# Patient Record
Sex: FEMALE | Race: WHITE | NOT HISPANIC OR LATINO
[De-identification: names, ages, dates, MRNs, and addresses within clinical notes are randomized per-mention and may not be internally consistent; named-entity substitution may affect disease eponyms.]

---

## 2017-06-01 ENCOUNTER — HOSPITAL ENCOUNTER (OUTPATIENT)
Dept: LAB | Age: 82
Setting detail: SPECIMEN
Discharge: HOME | End: 2017-06-01

## 2018-01-26 ENCOUNTER — RECORDS - HEALTHEAST (OUTPATIENT)
Dept: LAB | Facility: CLINIC | Age: 83
End: 2018-01-26

## 2018-01-26 LAB
ALBUMIN SERPL-MCNC: 3.5 G/DL (ref 3.5–5)
ALP SERPL-CCNC: 80 U/L (ref 45–120)
ALT SERPL W P-5'-P-CCNC: 20 U/L (ref 0–45)
ANION GAP SERPL CALCULATED.3IONS-SCNC: 7 MMOL/L (ref 5–18)
AST SERPL W P-5'-P-CCNC: 22 U/L (ref 0–40)
BILIRUB SERPL-MCNC: 0.5 MG/DL (ref 0–1)
BUN SERPL-MCNC: 19 MG/DL (ref 8–28)
CALCIUM SERPL-MCNC: 9.1 MG/DL (ref 8.5–10.5)
CHLORIDE BLD-SCNC: 104 MMOL/L (ref 98–107)
CHOLEST SERPL-MCNC: 271 MG/DL
CO2 SERPL-SCNC: 24 MMOL/L (ref 22–31)
CREAT SERPL-MCNC: 0.76 MG/DL (ref 0.6–1.1)
FASTING STATUS PATIENT QL REPORTED: ABNORMAL
GFR SERPL CREATININE-BSD FRML MDRD: >60 ML/MIN/1.73M2
GLUCOSE BLD-MCNC: 78 MG/DL (ref 70–125)
HDLC SERPL-MCNC: 63 MG/DL
LDLC SERPL CALC-MCNC: 177 MG/DL
POTASSIUM BLD-SCNC: 4.5 MMOL/L (ref 3.5–5)
PROT SERPL-MCNC: 7.1 G/DL (ref 6–8)
SODIUM SERPL-SCNC: 135 MMOL/L (ref 136–145)
TRIGL SERPL-MCNC: 154 MG/DL
TSH SERPL DL<=0.005 MIU/L-ACNC: 5.84 UIU/ML (ref 0.3–5)

## 2018-03-13 ENCOUNTER — TRANSFERRED RECORDS (OUTPATIENT)
Dept: HEALTH INFORMATION MANAGEMENT | Facility: CLINIC | Age: 83
End: 2018-03-13

## 2018-04-20 ENCOUNTER — RECORDS - HEALTHEAST (OUTPATIENT)
Dept: LAB | Facility: CLINIC | Age: 83
End: 2018-04-20

## 2018-04-20 LAB — TSH SERPL DL<=0.005 MIU/L-ACNC: 3.8 UIU/ML (ref 0.3–5)

## 2019-01-09 ENCOUNTER — RECORDS - HEALTHEAST (OUTPATIENT)
Dept: LAB | Facility: CLINIC | Age: 84
End: 2019-01-09

## 2019-01-09 LAB
ALBUMIN SERPL-MCNC: 3.6 G/DL (ref 3.5–5)
ALP SERPL-CCNC: 84 U/L (ref 45–120)
ALT SERPL W P-5'-P-CCNC: 13 U/L (ref 0–45)
ANION GAP SERPL CALCULATED.3IONS-SCNC: 8 MMOL/L (ref 5–18)
AST SERPL W P-5'-P-CCNC: 21 U/L (ref 0–40)
BASOPHILS # BLD AUTO: 0 THOU/UL (ref 0–0.2)
BASOPHILS NFR BLD AUTO: 0 % (ref 0–2)
BILIRUB SERPL-MCNC: 0.4 MG/DL (ref 0–1)
BUN SERPL-MCNC: 20 MG/DL (ref 8–28)
CALCIUM SERPL-MCNC: 9.2 MG/DL (ref 8.5–10.5)
CHLORIDE BLD-SCNC: 105 MMOL/L (ref 98–107)
CHOLEST SERPL-MCNC: 240 MG/DL
CO2 SERPL-SCNC: 24 MMOL/L (ref 22–31)
CREAT SERPL-MCNC: 0.85 MG/DL (ref 0.6–1.1)
EOSINOPHIL # BLD AUTO: 0.1 THOU/UL (ref 0–0.4)
EOSINOPHIL NFR BLD AUTO: 1 % (ref 0–6)
ERYTHROCYTE [DISTWIDTH] IN BLOOD BY AUTOMATED COUNT: 14.5 % (ref 11–14.5)
FASTING STATUS PATIENT QL REPORTED: ABNORMAL
GFR SERPL CREATININE-BSD FRML MDRD: >60 ML/MIN/1.73M2
GLUCOSE BLD-MCNC: 89 MG/DL (ref 70–125)
HCT VFR BLD AUTO: 34.8 % (ref 35–47)
HDLC SERPL-MCNC: 62 MG/DL
HGB BLD-MCNC: 11 G/DL (ref 12–16)
LDLC SERPL CALC-MCNC: 141 MG/DL
LYMPHOCYTES # BLD AUTO: 2.6 THOU/UL (ref 0.8–4.4)
LYMPHOCYTES NFR BLD AUTO: 46 % (ref 20–40)
MCH RBC QN AUTO: 31.3 PG (ref 27–34)
MCHC RBC AUTO-ENTMCNC: 31.6 G/DL (ref 32–36)
MCV RBC AUTO: 99 FL (ref 80–100)
MONOCYTES # BLD AUTO: 0.4 THOU/UL (ref 0–0.9)
MONOCYTES NFR BLD AUTO: 8 % (ref 2–10)
NEUTROPHILS # BLD AUTO: 2.6 THOU/UL (ref 2–7.7)
NEUTROPHILS NFR BLD AUTO: 46 % (ref 50–70)
PLATELET # BLD AUTO: 208 THOU/UL (ref 140–440)
PMV BLD AUTO: 10.7 FL (ref 8.5–12.5)
POTASSIUM BLD-SCNC: 4.4 MMOL/L (ref 3.5–5)
PROT SERPL-MCNC: 6.8 G/DL (ref 6–8)
RBC # BLD AUTO: 3.52 MILL/UL (ref 3.8–5.4)
SODIUM SERPL-SCNC: 137 MMOL/L (ref 136–145)
TRIGL SERPL-MCNC: 185 MG/DL
TSH SERPL DL<=0.005 MIU/L-ACNC: 3.29 UIU/ML (ref 0.3–5)
WBC: 5.7 THOU/UL (ref 4–11)

## 2019-01-12 LAB — BACTERIA SPEC CULT: ABNORMAL

## 2019-01-18 ENCOUNTER — TELEPHONE (OUTPATIENT)
Dept: OPHTHALMOLOGY | Facility: CLINIC | Age: 84
End: 2019-01-18

## 2019-01-18 ENCOUNTER — TRANSFERRED RECORDS (OUTPATIENT)
Dept: HEALTH INFORMATION MANAGEMENT | Facility: CLINIC | Age: 84
End: 2019-01-18

## 2019-01-18 ENCOUNTER — MEDICAL CORRESPONDENCE (OUTPATIENT)
Dept: HEALTH INFORMATION MANAGEMENT | Facility: CLINIC | Age: 84
End: 2019-01-18

## 2019-01-18 NOTE — TELEPHONE ENCOUNTER
Reviewed with on call provider    Pt scheduled tomorrow AM at [facility] with dr. Black    Son aware of date/time/location  Clive Amador RN 5:37 PM 01/18/19

## 2019-01-18 NOTE — TELEPHONE ENCOUNTER
Called [facility] eye still water at [phone] and spoke to referring physician    Pt been treated by dr. Salcedo     H/o right eye vision 20/500 by history    Left eye as been better seeing eye.    Left eye  In past two weeks since last seen dr. Salcedo-- new central retina vein occlusion, cornea edema, increased intraocular pressure/glaucoma, geographic atrophy  Vision left eye count fingers    IOP today 40 mm Hg    Dr. Bills saw pt today   Pt on timolol, dorzolamide, latanoprost and start alphagan after visit today    Would like f/u tonight/tomorrow for IOP management and referral to glaucoma for likely surgical intervention, will also need to start f/u with retina    Will review with on call eye MD plan of care    Clive Amador RN 4:52 PM 01/18/19

## 2019-01-19 ENCOUNTER — OFFICE VISIT (OUTPATIENT)
Dept: OPHTHALMOLOGY | Facility: CLINIC | Age: 84
End: 2019-01-19
Payer: MEDICARE

## 2019-01-19 DIAGNOSIS — Z96.1 PSEUDOPHAKIA OF BOTH EYES: ICD-10-CM

## 2019-01-19 DIAGNOSIS — H34.8112 CENTRAL RETINAL VEIN OCCLUSION OF RIGHT EYE, UNSPECIFIED COMPLICATION STATUS (H): Primary | ICD-10-CM

## 2019-01-19 DIAGNOSIS — H35.3110 NONEXUDATIVE AGE-RELATED MACULAR DEGENERATION OF RIGHT EYE, UNSPECIFIED STAGE: ICD-10-CM

## 2019-01-19 DIAGNOSIS — H40.052 BORDERLINE GLAUCOMA OF LEFT EYE WITH OCULAR HYPERTENSION: ICD-10-CM

## 2019-01-19 RX ORDER — DORZOLAMIDE HYDROCHLORIDE AND TIMOLOL MALEATE 20; 5 MG/ML; MG/ML
1 SOLUTION/ DROPS OPHTHALMIC 2 TIMES DAILY
COMMUNITY

## 2019-01-19 RX ORDER — LEVOTHYROXINE SODIUM 88 UG/1
88 TABLET ORAL DAILY
COMMUNITY

## 2019-01-19 RX ORDER — METOPROLOL TARTRATE 50 MG
50 TABLET ORAL DAILY
COMMUNITY

## 2019-01-19 RX ORDER — ACETAMINOPHEN 500 MG
500-1000 TABLET ORAL EVERY 6 HOURS PRN
COMMUNITY

## 2019-01-19 RX ORDER — BRIMONIDINE TARTRATE 2 MG/ML
1 SOLUTION/ DROPS OPHTHALMIC 3 TIMES DAILY
Qty: 5 ML | Refills: 0 | Status: SHIPPED | OUTPATIENT
Start: 2019-01-19 | End: 2019-03-28

## 2019-01-19 RX ORDER — LOSARTAN POTASSIUM 50 MG/1
50 TABLET ORAL DAILY
COMMUNITY

## 2019-01-19 RX ORDER — CHOLECALCIFEROL (VITAMIN D3) 50 MCG
1 TABLET ORAL DAILY
COMMUNITY

## 2019-01-19 RX ORDER — BRIMONIDINE TARTRATE 1.5 MG/ML
1 SOLUTION/ DROPS OPHTHALMIC 3 TIMES DAILY
COMMUNITY
End: 2019-03-28 | Stop reason: ALTCHOICE

## 2019-01-19 RX ORDER — LATANOPROST 50 UG/ML
1 SOLUTION/ DROPS OPHTHALMIC DAILY
COMMUNITY

## 2019-01-19 ASSESSMENT — VISUAL ACUITY
METHOD: SNELLEN - LINEAR
OS_CC: CF 2FT
CORRECTION_TYPE: GLASSES
OD_CC: CF 10FT

## 2019-01-19 ASSESSMENT — TONOMETRY
OS_IOP_MMHG: 26
OS_IOP_MMHG: 33
OD_IOP_MMHG: 09
IOP_METHOD: APPLANATION
IOP_METHOD: ICARE

## 2019-01-19 ASSESSMENT — REFRACTION_WEARINGRX
OD_SPHERE: -0.75
SPECS_TYPE: PAL
OD_ADD: +2.50
OD_CYLINDER: +1.25
OS_SPHERE: -2.25
OS_CYLINDER: +2.25
OS_AXIS: 020
OD_AXIS: 010
OS_ADD: +2.50

## 2019-01-19 ASSESSMENT — CONF VISUAL FIELD
OS_SUPERIOR_TEMPORAL_RESTRICTION: 1
OS_INFERIOR_NASAL_RESTRICTION: 1
OS_SUPERIOR_NASAL_RESTRICTION: 1
OD_INFERIOR_TEMPORAL_RESTRICTION: 3
OD_SUPERIOR_TEMPORAL_RESTRICTION: 3
OD_INFERIOR_NASAL_RESTRICTION: 3
OS_INFERIOR_TEMPORAL_RESTRICTION: 1
OD_SUPERIOR_NASAL_RESTRICTION: 3

## 2019-01-19 NOTE — NURSING NOTE
Patient present for consultation for possible cornea edema and vein occlusion LE x's 2-4 wks referred by [facility]. The vision in the LE has severely declined. The eyes feel a little gritty with discharge, no pain or discomfort. Flashes and floaters present. She has low vision with macular degeneration in the RE. Wears corrective lenses. Taking drops for eye pressure. Cherry Almendarez COT 8:36 AM January 19, 2019

## 2019-01-19 NOTE — PROGRESS NOTES
"CC:  CRVO    HPI:  89 y/o F with hx of recent CRVO, treated at [facility] with Dr. Salcedo, sent here for IOP check and glaucoma/retinal referral. Last seen in the office yesterday and found to have IOP 40. Started on topical timolol, dorzolamide, latanoprost, and alphagan.     Here today for IOP check. Given 3 doses of alphagan since starting the drop yesterday.    Patient denies any headache or eye pain. No acute changes since yesterday. No nausea/vomiting. Here with her son.    POH:  -Hx of 20/500 vision right eye - due to macular degeneration - no prior injections/laser    -CRVO left eye for the past 3 weeks (unclear of date of exact diagnosis?)  -s/p CE/IOL each eye in [place] many years ago  -? Hx of glaucoma --had been on timolol each eye for the past 4 years up until recently    Gtts:  Cosopt - 1 drop BID each eye - 7am this morning  Alphagan -1 drop TID left eye (started yesterday) - 6am this morning  Latanoprost - 1 drop at bedtime OU    All:  NKDA    A/P:  1. CRVO, right eye  2. Ocular hypertension, right eye  -IOP today 26 by icare, 33 by applanation (down from 40 yesterday per referral message)  -continue cosopt BID, latanoprost at bedtime, alphagan TID right eye  -recommend lubricating drops frequently for comfort - Refresh Celluvisc  -K surface irregular, improving K edema  -diffuse heme/NV at pupillary margin --> discussed that patient may require anti-VEGF injections +/- laser treatment  -deferred dilation today due to elevated IOP, but can visualize diffuse heme and CWS from CRVO  -discussed optimizing systemic risk factors: of note, pt had recent PCP eval and told BP, blood glucose were \"doing well\"; pt doesn't smoke; recommend tight lipid control  -return precautions reviewed    3. Pseudophakia, each eye  -stable, monitor    4. Macular degeneration, dry,with geograph atrophy, OD  -extensive geographic atrophy seen today right eye -no heme  -no prior injections/lasers per patient hx  -unable to " visualize extent left eye due to heme from CRVO  -recommend retina eval, as above  -AREDs vitamins, smoking cessation, amsler grid    F/u Monday for IOP check, dilation - retina/glaucoma eval    Monique Black MD  PGY-5, Cornea Fellow  Ophthalmology    Complete documentation of historical and exam elements from today's encounter can be found in the full encounter summary report (not reduplicated in this progress note). I personally obtained the chief complaint(s) and history of present illness.  I confirmed and edited as necessary the review of systems, past medical/surgical history, family history, social history, and examination findings as documented by others; and I examined the patient myself. I personally reviewed the relevant tests, images, and reports as documented above. I formulated and edited as necessary the assessment and plan and discussed the findings and management plan with the patient and family. I was present for all procedures performed during this visit.    Monique Black MD  Cornea Fellow

## 2019-01-19 NOTE — PATIENT INSTRUCTIONS
Eye Drop Instructions:    1. Continue dorzolamide/timolol - 1 drop twice daily in both eyes  2. Continue alphagan -  1 drop three times per day, left eye only  3. Continue latanoprost - 1 drop at bedtime, both eyes    4. Start Refresh Celluvisc (gel drop) - apply at least 5 minutes after any prescription drop; can apply up to every 1 hour for dry eye and irritation, as needed.     If you develop any acute severe pain, redness, or loss of vision, please go to the Emergency Department or contact the Eye Clinic immediately at [phone].

## 2019-01-20 ENCOUNTER — TELEPHONE (OUTPATIENT)
Dept: OPHTHALMOLOGY | Facility: CLINIC | Age: 84
End: 2019-01-20

## 2019-01-20 DIAGNOSIS — H34.8122 CENTRAL RETINAL VEIN OCCLUSION OF LEFT EYE, UNSPECIFIED COMPLICATION STATUS (H): Primary | ICD-10-CM

## 2019-01-20 NOTE — TELEPHONE ENCOUNTER
Spoke with patient's son, Basilio.    In short, patient's summary:   89 y/o F with hx of recent CRVO, treated at [facility] with Dr. Salcedo, sent to [facility] over weekend for IOP check and glaucoma/retinal referral. Last seen in the office yesterday and found to have IOP 34, improved on IOP drops. Started on topical timolol, dorzolamide, latanoprost, and alphagan.     Today:     Many concerns. Namely patient is having pain/ache in her LEFT eye. She was seen yesterday [place] [facility] and stated that she was doing better after they left. Per son, she might have missed some of the drops she was supposed to be taking. He was able to administer some this afternoon. In addition, patient having lots of back pain.     Most importantly, son worried because they can't find wheelchair transport to get her here tomorrow. I discussed the importance of this visit, given the urgency of managing patient's IOP and concern for NVI. He said he would try as best he could, and if he couldn't find a ride by tomorrow morning, would call and see if he could get there in the afternoon to be seen.     Son concerned that her pain might become unrelenting and he might have to have an ambulance take her to ER. I suggested if this the case that he goto [place] to expedite ophthalmology involvement.     David Kim MD  Ophthalmology Resident  PGY-2

## 2019-01-21 ENCOUNTER — OFFICE VISIT (OUTPATIENT)
Dept: OPHTHALMOLOGY | Facility: CLINIC | Age: 84
End: 2019-01-21
Attending: OPHTHALMOLOGY
Payer: MEDICARE

## 2019-01-21 DIAGNOSIS — H40.52X1 NEOVASCULAR GLAUCOMA OF LEFT EYE, MILD STAGE: ICD-10-CM

## 2019-01-21 DIAGNOSIS — H34.8121 CENTRAL RETINAL VEIN OCCLUSION WITH NEOVASCULARIZATION OF LEFT EYE (H): Primary | ICD-10-CM

## 2019-01-21 DIAGNOSIS — H34.8111 CENTRAL RETINAL VEIN OCCLUSION WITH NEOVASCULARIZATION OF RIGHT EYE (H): ICD-10-CM

## 2019-01-21 PROCEDURE — 25000128 H RX IP 250 OP 636: Mod: ZF | Performed by: OPHTHALMOLOGY

## 2019-01-21 PROCEDURE — G0463 HOSPITAL OUTPT CLINIC VISIT: HCPCS | Mod: ZF

## 2019-01-21 PROCEDURE — 67028 INJECTION EYE DRUG: CPT | Mod: LT

## 2019-01-21 PROCEDURE — 92134 CPTRZ OPH DX IMG PST SGM RTA: CPT | Mod: ZF | Performed by: OPHTHALMOLOGY

## 2019-01-21 PROCEDURE — C9257 BEVACIZUMAB INJECTION: HCPCS | Mod: ZF | Performed by: OPHTHALMOLOGY

## 2019-01-21 RX ADMIN — Medication 1.25 MG: at 11:43

## 2019-01-21 ASSESSMENT — CONF VISUAL FIELD
OD_SUPERIOR_TEMPORAL_RESTRICTION: 3
OD_INFERIOR_NASAL_RESTRICTION: 3
OS_SUPERIOR_TEMPORAL_RESTRICTION: 1
OS_SUPERIOR_NASAL_RESTRICTION: 1
OS_INFERIOR_NASAL_RESTRICTION: 1
OD_INFERIOR_TEMPORAL_RESTRICTION: 3
OD_SUPERIOR_NASAL_RESTRICTION: 3
OS_INFERIOR_TEMPORAL_RESTRICTION: 1

## 2019-01-21 ASSESSMENT — TONOMETRY
OD_IOP_MMHG: 13
IOP_METHOD: APPLANATION
OS_IOP_MMHG: 26

## 2019-01-21 ASSESSMENT — VISUAL ACUITY
OS_CC: HM
OD_CC: CF 5FT
METHOD: SNELLEN - LINEAR

## 2019-01-21 ASSESSMENT — CUP TO DISC RATIO: OD_RATIO: 0.6

## 2019-01-21 ASSESSMENT — SLIT LAMP EXAM - LIDS
COMMENTS: DERMATOCHALASIS
COMMENTS: DERMATOCHALASIS

## 2019-01-21 NOTE — NURSING NOTE
Patient presents for CRVO LE. Since the last visit the vision is stable. Had one episode of quick sharp stabbing pain in the LE. Taking Alphagan, Latanoprost, & Dorz/Isiah around 6am. Diagnosed with CRVO and possible cornea edema x's 2-4wks. Cherry Almendarez COT 8:36 AM January 21, 2019

## 2019-01-21 NOTE — PROGRESS NOTES
"CC:  CRVO    HPI:  89 y/o F with hx of recent CRVO, treated at [facility] with Dr. Salcedo. Seen 1/18 Friday [facility] for vision loss and \"reddish tinge\" to vision. Symptoms first began a few weeks prior. Initial IOP 40 1/18, seen by Dr. Black 1/19/19 with IOP 33. Continuing topical Cosopt, latanoprost, and alphagan. Having difficulty getting drops in during past day.     H/o baseline poor vision OD 2/2 macular.   Patient denies any headache or eye pain. No acute changes since yesterday. No nausea/vomiting. Here with her son.    POH:  -Hx of 20/500 vision right eye - due to macular degeneration - no prior injections/laser  -CRVO left eye for the past 3 weeks (unclear of date of exact diagnosis? ~1/1/19)  -s/p CE/IOL each eye in [place] many years ago  -? Hx of glaucoma --had been on timolol each eye for the past 4 years up until recently    Gtts:  Cosopt - 1 drop BID each eye - 7am this morning  Alphagan -1 drop TID left eye (started yesterday) - 6am this morning  Latanoprost - 1 drop at bedtime OU    All:  NKDA    A/P:  1. CRVO, left eye with NVI/NVG  2. Ocular hypertension, left eye  -IOP today 26 (from 40 --> 33)  -K surface irregular, improving K edema  -diffuse heme/NV at pupillary margin    - poor dilation, cannot sit at slit lamp; would be difficult laser  - family has no interest in any surgical intervention    - recommend intravitreal Avastin OS today 1/21/19  - continue cosopt BID, latanoprost at bedtime, alphagan TID right eye  - recommend lubricating drops frequently for comfort - Refresh Celluvisc  - discussed optimizing systemic risk factors: of note, pt had recent PCP eval and told BP, blood glucose were \"doing well\"; pt doesn't smoke; recommend tight lipid control  - return precautions reviewed    3. Pseudophakia, each eye  -stable, monitor    4. Macular degeneration, dry,with geographic atrophy, OD  -extensive geographic atrophy seen today right eye -no heme  -no prior injections/lasers per " patient hx  -unable to visualize extent left eye due to heme from CRVO  -recommend retina eval, as above  -AREDs vitamins, smoking cessation, amsler grid      Return 3 weeks retina - dilated, OCT macula, possible injection OS  ideally 1 week for IOP check as well; son will try to find a [place]      Dale Menard M.D.  PGY-3, Ophthalmology    Teaching statement:  Complete documentation of historical and exam elements from today's encounter can be found in the full encounter summary report (not reduplicated in this progress note). I personally obtained the chief complaint(s) and history of present illness.  I confirmed and edited as necessary the review of systems, past medical/surgical history, family history, social history, and examination findings as documented by others; and I examined the patient myself. I personally reviewed the relevant tests, images, and reports as documented above.     I formulated and edited as necessary the assessment and plan and discussed the findings and management plan with the patient and family.    Freida Cano MD  Comprehensive Ophthalmology & Ocular Pathology  Department of Ophthalmology and Visual Neurosciences  [email or web address]  Pager [phone]

## 2019-02-11 DIAGNOSIS — H34.8120 CENTRAL RETINAL VEIN OCCLUSION WITH MACULAR EDEMA OF LEFT EYE (H): Primary | ICD-10-CM

## 2019-02-12 ENCOUNTER — TELEPHONE (OUTPATIENT)
Dept: OPHTHALMOLOGY | Facility: CLINIC | Age: 84
End: 2019-02-12

## 2019-02-12 NOTE — TELEPHONE ENCOUNTER
Rescheduled to this Friday at 0800  Clive Amador RN 8:06 AM 02/12/19           Health Call Center    Phone Message    May a detailed message be left on voicemail: yes    Reason for Call: Other: Pt needs to reschedule. Next available with Milli is 02/26. Pt's son is unsure if Milli wants pt to be seen sooner than that or if that would be an appropriate f/u time frame. Son says that Pt gets a shot for a blockage in her Retina. Please call son Basilio back.     Action Taken: Message routed to:  [facility] ([facility]): [facility] oph adult csc

## 2019-02-15 ENCOUNTER — OFFICE VISIT (OUTPATIENT)
Dept: OPHTHALMOLOGY | Facility: CLINIC | Age: 84
End: 2019-02-15
Attending: OPHTHALMOLOGY
Payer: MEDICARE

## 2019-02-15 DIAGNOSIS — H34.8120 CENTRAL RETINAL VEIN OCCLUSION WITH MACULAR EDEMA OF LEFT EYE (H): ICD-10-CM

## 2019-02-15 DIAGNOSIS — H34.8121 CENTRAL RETINAL VEIN OCCLUSION WITH NEOVASCULARIZATION OF LEFT EYE (H): Primary | ICD-10-CM

## 2019-02-15 PROCEDURE — G0463 HOSPITAL OUTPT CLINIC VISIT: HCPCS | Mod: 25

## 2019-02-15 PROCEDURE — 67028 INJECTION EYE DRUG: CPT | Mod: LT,ZF | Performed by: OPHTHALMOLOGY

## 2019-02-15 PROCEDURE — 92235 FLUORESCEIN ANGRPH MLTIFRAME: CPT | Mod: ZF | Performed by: OPHTHALMOLOGY

## 2019-02-15 PROCEDURE — 92134 CPTRZ OPH DX IMG PST SGM RTA: CPT | Mod: ZF | Performed by: OPHTHALMOLOGY

## 2019-02-15 PROCEDURE — 25000128 H RX IP 250 OP 636: Mod: ZF | Performed by: OPHTHALMOLOGY

## 2019-02-15 PROCEDURE — C9257 BEVACIZUMAB INJECTION: HCPCS | Mod: ZF | Performed by: OPHTHALMOLOGY

## 2019-02-15 PROCEDURE — 67228 TREATMENT X10SV RETINOPATHY: CPT | Mod: LT,ZF | Performed by: OPHTHALMOLOGY

## 2019-02-15 RX ADMIN — Medication 1.25 MG: at 11:22

## 2019-02-15 SDOH — HEALTH STABILITY: MENTAL HEALTH: HOW OFTEN DO YOU HAVE A DRINK CONTAINING ALCOHOL?: NEVER

## 2019-02-15 ASSESSMENT — REFRACTION_WEARINGRX
OD_CYLINDER: +1.25
OD_AXIS: 010
OS_ADD: +2.50
SPECS_TYPE: PAL
OD_ADD: +2.50
OS_AXIS: 020
OD_SPHERE: -0.75
OS_SPHERE: -2.25
OS_CYLINDER: +2.25

## 2019-02-15 ASSESSMENT — CONF VISUAL FIELD
OS_INFERIOR_NASAL_RESTRICTION: 1
METHOD: COUNTING FINGERS

## 2019-02-15 ASSESSMENT — SLIT LAMP EXAM - LIDS
COMMENTS: DERMATOCHALASIS
COMMENTS: DERMATOCHALASIS

## 2019-02-15 ASSESSMENT — VISUAL ACUITY
CORRECTION_TYPE: GLASSES
OS_CC: HM
OD_CC: CF @ 3'
METHOD: SNELLEN - LINEAR

## 2019-02-15 ASSESSMENT — CUP TO DISC RATIO
OS_RATIO: SMALL
OD_RATIO: 0.6

## 2019-02-15 ASSESSMENT — TONOMETRY
OS_IOP_MMHG: 18
IOP_METHOD: APPLANATION
OD_IOP_MMHG: 16

## 2019-02-15 NOTE — PROGRESS NOTES
CC -   CRVO- new referral from [facility] for CRVO     INTERVAL HISTORY - Initial visit with me. No clear changes in vision per patient & family  No HA/temporal tender/jaw claudication/weight loss/fever.  Good TA pulses BL, no tenderness on palpation    HPI -   Kala Peña is a 89 year old year-old monocular (right eye baseline 20/500 due to AMD, no prior injections/ laser) patient with history of OAG, AMD, recent CRVO LEFT eye.  Seen 1/18/19 [facility] by Dr Salcedo for vision loss & reddish tinge OS that had begun few weeks prior with progression (~1/1/19 onset).  Initial IOP OS 40 on 1/18/19, seen by Dr. Black was 1/19/19, started on gtts.  Given Avastin #1 1/21/19  Had prior eye exam 11/30/19 for baseline glaucoma was on xalatan and Cosopt baseline, added alphagan with latest IOP rise after CRVO  H/o valvular insufficiency, no h/o CVA/TIA, had cardiac U/S per son ~2017    GTTS  Cosopt - 1 drop BID each eye   Latanoprost - 1 drop at bedtime OU  Alphagan -1 drop TID left eye         PAST OCULAR SURGERY  CE/IOL OU ~ 1999      RETINAL IMAGING:  OCT 2/15/19  OD - Geographic atrophy, moderate drusen, no IRF/SRF, PVD  OS - Moderate drusen, retinal thickening, trace IRF, tr ERM, PVD    FA 2-15-19  OD -   OS -       ASSESSMENT & PLAN    1.  Combined CRVO/CRAO OS with NVG   - ?onset ~ 12/2018   - had routine eye exam 11/30/18 not present    - strongly suspect CRAO based on exam and imaging   - ischemic with NVG   - minimal CME not likely visually significant   - s/p Avastin 1-21-19 NVI regressed (25 days)     - plan PRP today with Avastin today (difficult for patient to return frequently)   - RTC 6 weeks possible fill-in PRP (1000 spots today)    2.  CRAO OS   - strongly suspect CRAO based on exam and imaging   - most likely d/t CRVO   - no GCA symptoms, clinical suspicion low   - had cardiac U/S 2 years ago (~2017) per son   - could consider carotid U/S   - d/w patient & son declines imaging or blood tests   -  wishes minimal interventions or visits      3. NVG OS   - see #1 for injections/laser    - IOP OK today   - continue gtts   - sees Dr. Salcedo locally    4. Dry AMD OD with GA   - advanced, vision loss   - refer to low vision      5. PVD OU      RTC 6 weeks OCT OU    Carolin Hurtado MD  PGY-3 Ophthalmology    ATTESTATION     Attending Physician Attestation:      Complete documentation of historical and exam elements from today's encounter can be found in the full encounter summary report (not reduplicated in this progress note).  I personally obtained the chief complaint(s) and history of present illness.  I confirmed and edited as necessary the review of systems, past medical/surgical history, family history, social history, and examination findings as documented by others; and I examined the patient myself.  I personally reviewed the relevant tests, images, and reports as documented above.  I formulated and edited as necessary the assessment and plan and discussed the findings and management plan with the patient and family, I was present for the entire procedure performed by the resident/fellow. and I was present for critical portions of the procedure carried out by the resident/fellow and immediately available for the entire procedure    Tere Morley MD, PhD  , Vitreoretinal Surgery  Department of Ophthalmology  [facility]

## 2019-02-15 NOTE — NURSING NOTE
Chief Complaints and History of Present Illnesses   Patient presents with     Follow Up      Chief Complaint(s) and History of Present Illness(es)     Follow Up     Laterality: left eye    Treatments tried: eye drops              Comments     3 week follow up of CRVO left eye with dilated exam.    Vision is the same in each eye since last exam 3 weeks ago.  No changes noticed per patient.  History of :  CRVO left eye   Eye meds: cosopt BID each eye, latanoprost at bedtime each eye, alphagan TID right eye  ESTIVEN Salcedo 2/15/2019 8:26 AM

## 2019-03-28 ENCOUNTER — TELEPHONE (OUTPATIENT)
Dept: OPHTHALMOLOGY | Facility: CLINIC | Age: 84
End: 2019-03-28

## 2019-03-28 DIAGNOSIS — H34.8112 CENTRAL RETINAL VEIN OCCLUSION OF RIGHT EYE, UNSPECIFIED COMPLICATION STATUS (H): ICD-10-CM

## 2019-03-28 RX ORDER — BRIMONIDINE TARTRATE 2 MG/ML
1 SOLUTION/ DROPS OPHTHALMIC 3 TIMES DAILY
Qty: 5 ML | Refills: 1 | Status: SHIPPED | OUTPATIENT
Start: 2019-03-28 | End: 2019-05-21

## 2019-03-28 NOTE — TELEPHONE ENCOUNTER
Pt to continue brimonidine 3/day left eye per last note--Rx sent  Clive Amador RN 4:20 PM 03/28/19        [facility] Call Center    Phone Message    May a detailed message be left on voicemail: yes    Reason for Call: Medication Question or concern regarding medication   Prescription Clarification  Name of Medication: brimonidine (ALPHAGAN) 0.2 % ophthalmic solution  Prescribing Provider: JORGE   Pharmacy: [facility] [address] [place] [address] [address] AT [facility] [address]   What on the order needs clarification? Pt/Son is wondering if this Rx is meant to be temporary or if pt needs to continue taking it. Pt will be out by the end of the week and if it's not temporary, please refill to above pharmacy.     Action Taken: Message routed to:  [facility] ([facility]): EYE

## 2019-04-02 ENCOUNTER — OFFICE VISIT (OUTPATIENT)
Dept: OPHTHALMOLOGY | Facility: CLINIC | Age: 84
End: 2019-04-02
Attending: OPHTHALMOLOGY
Payer: MEDICARE

## 2019-04-02 DIAGNOSIS — H34.8120 CENTRAL RETINAL VEIN OCCLUSION WITH MACULAR EDEMA OF LEFT EYE (H): ICD-10-CM

## 2019-04-02 DIAGNOSIS — H34.8121 CENTRAL RETINAL VEIN OCCLUSION WITH NEOVASCULARIZATION OF LEFT EYE (H): ICD-10-CM

## 2019-04-02 PROCEDURE — 92134 CPTRZ OPH DX IMG PST SGM RTA: CPT | Mod: ZF | Performed by: OPHTHALMOLOGY

## 2019-04-02 PROCEDURE — G0463 HOSPITAL OUTPT CLINIC VISIT: HCPCS | Mod: ZF

## 2019-04-02 PROCEDURE — 92250 FUNDUS PHOTOGRAPHY W/I&R: CPT | Mod: XS | Performed by: OPHTHALMOLOGY

## 2019-04-02 RX ORDER — HYDROCODONE BITARTRATE AND ACETAMINOPHEN 5; 325 MG/1; MG/1
TABLET ORAL
Refills: 0 | COMMUNITY
Start: 2019-03-25

## 2019-04-02 ASSESSMENT — VISUAL ACUITY
CORRECTION_TYPE: GLASSES
METHOD: SNELLEN - LINEAR
OD_CC: CF @ 3 FT
OS_CC: CF @ 2FT

## 2019-04-02 ASSESSMENT — REFRACTION_WEARINGRX
OD_CYLINDER: +1.25
OS_SPHERE: -2.25
SPECS_TYPE: PAL
OS_CYLINDER: +2.25
OD_SPHERE: -0.75
OD_ADD: +2.50
OS_ADD: +2.50
OD_AXIS: 010
OS_AXIS: 020

## 2019-04-02 ASSESSMENT — CONF VISUAL FIELD
OS_INFERIOR_NASAL_RESTRICTION: 3
OD_SUPERIOR_NASAL_RESTRICTION: 3
OS_SUPERIOR_TEMPORAL_RESTRICTION: 3
OD_INFERIOR_TEMPORAL_RESTRICTION: 3
OS_INFERIOR_TEMPORAL_RESTRICTION: 3
OD_SUPERIOR_TEMPORAL_RESTRICTION: 3
OD_INFERIOR_NASAL_RESTRICTION: 3
OS_SUPERIOR_NASAL_RESTRICTION: 3

## 2019-04-02 ASSESSMENT — SLIT LAMP EXAM - LIDS
COMMENTS: DERMATOCHALASIS
COMMENTS: DERMATOCHALASIS

## 2019-04-02 ASSESSMENT — TONOMETRY
OD_IOP_MMHG: 14
IOP_METHOD: APPLANATION
IOP_METHOD: ICARE
IOP_METHOD: TONOPEN
OD_IOP_MMHG: 15
OS_IOP_MMHG: 45
OS_IOP_MMHG: 34
OS_IOP_MMHG: 35

## 2019-04-02 ASSESSMENT — CUP TO DISC RATIO
OS_RATIO: SMALL
OD_RATIO: 0.6

## 2019-04-02 NOTE — LETTER
4/2/2019       RE: Kala Peña  [address]  [place] [address]     Dear Colleague,    Thank you for referring your patient, Kala Peña, to the EYE CLINIC at [facility]. Please see a copy of my visit note below.    CC -   CRVO follow up    INTERVAL HISTORY - Patient denies changes since last visit. Last seen for glaucoma January at [facility].  No pain    HPI - Kala Peña is a 89 year old year-old monocular (right eye baseline 20/500 due to AMD, no prior injections/ laser) patient with history of OAG, AMD, recent CRVO LEFT eye.  Seen 1/18/19 [facility] by Dr Salcedo for vision loss & reddish tinge OS that had begun few weeks prior with progression (~1/1/19 onset).  Initial IOP OS 40 on 1/18/19, seen by Dr. Black was 1/19/19, started on gtts.  Given Avastin #1 1/21/19  Had prior eye exam 11/30/19 for baseline glaucoma was on xalatan and Cosopt baseline, added alphagan with latest IOP rise after CRVO  H/o valvular insufficiency, no h/o CVA/TIA, had cardiac U/S per son ~2017    GTTS  Cosopt - 1 drop BID each eye   Latanoprost - 1 drop at bedtime OU  Alphagan -1 drop TID left eye (x 2 months)      PAST OCULAR SURGERY  CE/IOL OU ~ 1999    RETINAL IMAGING:  OCT 4-2-19  OD - Geographic atrophy, moderate drusen, no IRF/SRF, PVD, appears stable  OS - Moderate drusen, retinal thickening, trace IRF, tr ERM, PVD, appear stable    FA 2-15-19  OD - staining in area of macular atrophy  OS - (transit) Delayed arterial filling and delayed AV transit, blockage in area of heme  ASSESSMENT & PLAN    1.  Combined CRVO/CRAO OS with NVG   - ?onset ~ 12/2018   - had routine eye exam 11/30/18 not present    - strongly suspect CRAO based on exam and imaging   - ischemic with NVG   - minimal CME not likely visually significant   - s/p Avastin 1-21-19 NVI regressed (2.5 months)     - s/p PRP with Avastin  2/15/19 (1.5 months)   - f/u 1 month    2.  CRAO OS   - strongly suspect CRAO based  on exam and imaging   - most likely d/t CRVO   - no GCA symptoms, clinical suspicion low   - had cardiac U/S 2 years ago (~2017) per son   - could consider carotid U/S   - d/w patient & son declines imaging or blood tests   - wishes minimal interventions or visits    3. NVG OS   - see #1 for injections/laser    - IOP elevated today   - continue gtts   - sees Dr. Salcedo locally   - hesitant to start Diamox due to patient age, could try methazolamide as seems ot be better tolerated   - patient may need a tube at some point though family wishes minimal intervention   - patient is comfortable at this time     - will see glaucoma specialist for opinion    4. Dry AMD OD with GA   - advanced, vision loss   - refer to low vision    5. PVD OU    RTC 1 month OCT OU    Carolin Hurtado MD  PGY-3 Ophthalmology      ATTESTATION   Attending Physician Attestation:  Complete documentation of historical and exam elements from today's encounter can be found in the full encounter summary report (not reduplicated in this progress note).  I personally obtained the chief complaint(s) and history of present illness.  I confirmed and edited as necessary the review of systems, past medical/surgical history, family history, social history, and examination findings as documented by others; and I examined the patient myself.  I personally reviewed the relevant tests, images, and reports as documented above.  I personally reviewed the ophthalmic test(s) associated with this encounter, agree with the interpretation(s) as documented by the resident/fellow, and have edited the corresponding report(s) as necessary.   I formulated and edited as necessary the assessment and plan and discussed the findings and management plan with the patient and family.  Tere Morley MD, PhD    Base Eye Exam     Visual Acuity (Snellen - Linear)       Right Left    Dist cc CF @ 3 FT CF @ 2FT    Correction:  Glasses          Tonometry (ICare, 1:22 PM)       Right  Left    Pressure 15 35          Tonometry #2 (Tonopen, 1:24 PM)       Right Left    Pressure  45          Tonometry #3 (Applanation, 1:30 PM)       Right Left    Pressure 14 34          Pupils       React APD    Right Minimal None    Left Minimal None          Visual Fields       Left Right    Restrictions Partial outer superior temporal, inferior temporal, superior nasal, inferior nasal deficiencies Partial outer superior temporal, inferior temporal, superior nasal, inferior nasal deficiencies          Neuro/Psych     Oriented x3:  Yes    Mood/Affect:  Normal          Dilation     Both eyes:  1.0% Mydriacyl, 2.5% Devan Synephrine @ 1:31 PM            Slit Lamp and Fundus Exam     Slit Lamp Exam       Right Left    Lids/Lashes Dermatochalasis Dermatochalasis    Conjunctiva/Sclera White and quiet trace injection    Cornea Clear Irregular, PEEs    Anterior Chamber Deep and quiet Deep and quiet    Iris Dilated poor dilation, no NVI    Lens PCIOL PCIOL    Vitreous PVD PVD          Fundus Exam       Right Left    Disc Normal Disc pallor, peripapillary heme    C/D Ratio 0.6 small    Macula GA central DBH    Vessels Normal attenuated, mild tortuosity    Periphery Normal 4+ DBH, scattered PRP            Refraction     Wearing Rx       Sphere Cylinder Axis Add    Right -0.75 +1.25 010 +2.50    Left -2.25 +2.25 020 +2.50    Type:  PAL                Again, thank you for allowing me to participate in the care of your patient.      Sincerely,    Tere Morley MD, PhD  , Vitreoretinal Surgery  Department of Ophthalmology & Visual Neurosciences  [facility]

## 2019-04-02 NOTE — PROGRESS NOTES
CC -   CRVO follow up    INTERVAL HISTORY - Patient denies changes since last visit. Last seen for glaucoma January at [facility].  No pain      HPI -   Kala Peña is a 89 year old year-old monocular (right eye baseline 20/500 due to AMD, no prior injections/ laser) patient with history of OAG, AMD, recent CRVO LEFT eye.  Seen 1/18/19 [facility] by Dr Salcedo for vision loss & reddish tinge OS that had begun few weeks prior with progression (~1/1/19 onset).  Initial IOP OS 40 on 1/18/19, seen by Dr. Black was 1/19/19, started on gtts.  Given Avastin #1 1/21/19  Had prior eye exam 11/30/19 for baseline glaucoma was on xalatan and Cosopt baseline, added alphagan with latest IOP rise after CRVO  H/o valvular insufficiency, no h/o CVA/TIA, had cardiac U/S per son ~2017    GTTS  Cosopt - 1 drop BID each eye   Latanoprost - 1 drop at bedtime OU  Alphagan -1 drop TID left eye (x 2 months)      PAST OCULAR SURGERY  CE/IOL OU ~ 1999      RETINAL IMAGING:  OCT 4-2-19  OD - Geographic atrophy, moderate drusen, no IRF/SRF, PVD, appears stable  OS - Moderate drusen, retinal thickening, trace IRF, tr ERM, PVD, appear stable    FA 2-15-19  OD - staining in area of macular atrophy  OS - (transit) Delayed arterial filling and delayed AV transit, blockage in area of heme      ASSESSMENT & PLAN    1.  Combined CRVO/CRAO OS with NVG   - ?onset ~ 12/2018   - had routine eye exam 11/30/18 not present    - strongly suspect CRAO based on exam and imaging   - ischemic with NVG   - minimal CME not likely visually significant   - s/p Avastin 1-21-19 NVI regressed (2.5 months)     - s/p PRP with Avastin  2/15/19 (1.5 months)   - f/u 1 month    2.  CRAO OS   - strongly suspect CRAO based on exam and imaging   - most likely d/t CRVO   - no GCA symptoms, clinical suspicion low   - had cardiac U/S 2 years ago (~2017) per son   - could consider carotid U/S   - d/w patient & son declines imaging or blood tests   - wishes minimal  interventions or visits      3. NVG OS   - see #1 for injections/laser    - IOP elevated today   - continue gtts   - sees Dr. Salcedo locally   - hesitant to start Diamox due to patient age, could try methazolamide as seems ot be better tolerated   - patient may need a tube at some point though family wishes minimal intervention   - patient is comfortable at this time     - will see glaucoma specialist for opinion    4. Dry AMD OD with GA   - advanced, vision loss   - refer to low vision      5. PVD OU      RTC 1 month OCT OU    Carolin Hurtado MD  PGY-3 Ophthalmology        ATTESTATION     Attending Physician Attestation:      Complete documentation of historical and exam elements from today's encounter can be found in the full encounter summary report (not reduplicated in this progress note).  I personally obtained the chief complaint(s) and history of present illness.  I confirmed and edited as necessary the review of systems, past medical/surgical history, family history, social history, and examination findings as documented by others; and I examined the patient myself.  I personally reviewed the relevant tests, images, and reports as documented above.  I personally reviewed the ophthalmic test(s) associated with this encounter, agree with the interpretation(s) as documented by the resident/fellow, and have edited the corresponding report(s) as necessary.   I formulated and edited as necessary the assessment and plan and discussed the findings and management plan with the patient and family    Tere Morley MD, PhD  , Vitreoretinal Surgery  Department of Ophthalmology  [facility]

## 2019-04-02 NOTE — NURSING NOTE
Chief Complaints and History of Present Illnesses   Patient presents with     Blurred Vision Follow-Up     Chief Complaint(s) and History of Present Illness(es)     Kala Peña is a 89 year old female who presents today for    1.  Combined CRVO/CRAO OS with NVG              - ?onset ~ 12/2018              -PRP with Avastin at last visit.     2.  CRAO OS              - most likely d/t CRVO     3. NVG OS  4. Dry AMD OD with GA      5. PVD each eye    No sigificant changes in vision since the last visit.     Aura JEAN-BAPTISTE 1:13 PM April 2, 2019

## 2019-04-04 ENCOUNTER — TELEPHONE (OUTPATIENT)
Dept: OPHTHALMOLOGY | Facility: CLINIC | Age: 84
End: 2019-04-04

## 2019-04-04 NOTE — TELEPHONE ENCOUNTER
Reviewed with glaucoma clinic   Offered April 18th or 23rd   Scheduled April 23rd at 1215 Pm  Pt/son aware of date/time/location  Clive Amador RN 11:57 AM 04/04/19          [facility] Call Center    Phone Message    May a detailed message be left on voicemail: yes    Reason for Call: Other: .  pt son is concerned about wait time on surgery and wanted clarification if waiting so long is ok. Please call Basilio back    6/18 current scheduled date    Action Taken: Message routed to:  Other: eye clinic

## 2019-04-23 ENCOUNTER — OFFICE VISIT (OUTPATIENT)
Dept: OPHTHALMOLOGY | Facility: CLINIC | Age: 84
End: 2019-04-23
Attending: OPHTHALMOLOGY
Payer: MEDICARE

## 2019-04-23 DIAGNOSIS — H34.8121 CENTRAL RETINAL VEIN OCCLUSION WITH NEOVASCULARIZATION OF LEFT EYE (H): ICD-10-CM

## 2019-04-23 DIAGNOSIS — Z96.1 PSEUDOPHAKIA OF BOTH EYES: ICD-10-CM

## 2019-04-23 DIAGNOSIS — H40.9 GLAUCOMA: ICD-10-CM

## 2019-04-23 DIAGNOSIS — H40.52X3 NEOVASCULAR GLAUCOMA OF LEFT EYE, SEVERE STAGE: Primary | ICD-10-CM

## 2019-04-23 PROCEDURE — G0463 HOSPITAL OUTPT CLINIC VISIT: HCPCS | Mod: ZF

## 2019-04-23 PROCEDURE — 76514 ECHO EXAM OF EYE THICKNESS: CPT | Mod: ZF | Performed by: OPHTHALMOLOGY

## 2019-04-23 ASSESSMENT — VISUAL ACUITY
METHOD: SNELLEN - LINEAR
OD_CC: 5/200 E
OS_CC: 7/200 E
CORRECTION_TYPE: GLASSES

## 2019-04-23 ASSESSMENT — CUP TO DISC RATIO
OD_RATIO: 0.6
OS_RATIO: 0.8

## 2019-04-23 ASSESSMENT — CONF VISUAL FIELD
OD_SUPERIOR_TEMPORAL_RESTRICTION: 3
OD_INFERIOR_TEMPORAL_RESTRICTION: 3
OS_INFERIOR_NASAL_RESTRICTION: 3
OS_SUPERIOR_NASAL_RESTRICTION: 1
OD_SUPERIOR_NASAL_RESTRICTION: 3
OD_INFERIOR_NASAL_RESTRICTION: 3
OS_INFERIOR_TEMPORAL_RESTRICTION: 3
OS_SUPERIOR_TEMPORAL_RESTRICTION: 1

## 2019-04-23 ASSESSMENT — REFRACTION_WEARINGRX
OD_ADD: +2.50
OD_AXIS: 010
OS_SPHERE: -2.25
SPECS_TYPE: PAL
OD_CYLINDER: +1.25
OS_ADD: +2.50
OS_CYLINDER: +2.25
OD_SPHERE: -0.75
OS_AXIS: 020

## 2019-04-23 ASSESSMENT — SLIT LAMP EXAM - LIDS
COMMENTS: DERMATOCHALASIS
COMMENTS: DERMATOCHALASIS

## 2019-04-23 ASSESSMENT — PACHYMETRY
OD_CT(UM): 552
OS_CT(UM): 558

## 2019-04-23 ASSESSMENT — TONOMETRY
OD_IOP_MMHG: 18
IOP_METHOD: TONOPEN
OS_IOP_MMHG: 31

## 2019-04-23 NOTE — PATIENT INSTRUCTIONS
Patient will continue on Cosopt (Timolol/Dorzolamide) which is a blue top drop 2x/day (12 hours apart) in BOTH EYES, Latanoprost which is a teal top drop at bedtime in BOTH EYES and Alphagan (Brimonidine) which is a purple top drop 3x/day (8 hours apart) in the LEFT EYE ONLY.  A long discussion of the risks, benefits, and alternatives including potential treatment and management options were had with patient and a decision was made to proceed with either the tube surgery or the micropulse CPC (laser) in the left eye.  Patient will return to clinic in 1-2 months with repeat IOP check if she declines surgery.

## 2019-04-23 NOTE — PROGRESS NOTES
1)?POAG OD/NVG OS -- started on drops prior to moving to [facility], possibly started on gtts in ?2010 while in [place] (following with Dr. Pang)  --  K pachy: 552/558   Tmax:  OS:40     HVF:  Unable    CDR:0.6/0.8     HRT/OCT:       FHX of Glc:    Gonio:       Intolerant to:      Asthma/COPD:  No, tolerating topical and po BB Steroid Use: No     Kidney Stones:No     Sulfa Allergy: No     IOP targets:  2)H/O ?combined CRAO/CRVO OS s/p Avastin and PRP -- following with Dr. Morley  3)AMD with GA OU -- following with Dr. quintanilla  4)PCIOL OU -- [place] [facility] [place]  5)H/O Afib -- on arias FERNANDEZ:son, Basilio Peña ([phone]), is POA    MD:pts last INR was over 6 -- needs improvement prior to surgery    MD:getting patient to appointments is challenging    Patient will continue on Cosopt (Timolol/Dorzolamide) which is a blue top drop 2x/day (12 hours apart) in BOTH EYES, Latanoprost which is a teal top drop at bedtime in BOTH EYES and Alphagan (Brimonidine) which is a purple top drop 3x/day (8 hours apart) in the LEFT EYE ONLY.  A long discussion of the risks, benefits, and alternatives including potential treatment and management options were had with patient and a decision was made to proceed with either the tube surgery or the micropulse CPC (laser) in the left eye.  Patient will return to clinic in 1-2 months with repeat IOP check if she declines surgery.    Attending Physician Attestation:  Complete documentation of historical and exam elements from today's encounter can be found in the full encounter summary report (not reduplicated in this progress note). I personally obtained the chief complaint(s) and history of present illness.  I confirmed and edited as necessary the review of systems, past medical/surgical history, family history, social history, and examination findings as documented by others; and I examined the patient myself. I personally reviewed the relevant tests, images, and reports as  documented above. I formulated and edited as necessary the assessment and plan and discussed the findings and management plan with the patient and family.  - Geena Grossman MD

## 2019-04-23 NOTE — NURSING NOTE
Chief Complaints and History of Present Illnesses   Patient presents with     Glaucoma     Glaucoma LE       Chief Complaint(s) and History of Present Illness(es)     Glaucoma     Comments: Glaucoma LE                Comments     Pt with her son Basilio today.  Pt states vision is the same as last visit. No eye pain today. No flashes.    Kathy SELF April 23, 2019 12:34 PM

## 2019-05-06 ENCOUNTER — OFFICE VISIT (OUTPATIENT)
Dept: OPHTHALMOLOGY | Facility: CLINIC | Age: 84
End: 2019-05-06
Attending: OPHTHALMOLOGY
Payer: MEDICARE

## 2019-05-06 ENCOUNTER — TELEPHONE (OUTPATIENT)
Dept: OPHTHALMOLOGY | Facility: CLINIC | Age: 84
End: 2019-05-06

## 2019-05-06 DIAGNOSIS — H34.8120 CENTRAL RETINAL VEIN OCCLUSION WITH MACULAR EDEMA OF LEFT EYE (H): ICD-10-CM

## 2019-05-06 DIAGNOSIS — H34.8121 CENTRAL RETINAL VEIN OCCLUSION WITH NEOVASCULARIZATION OF LEFT EYE (H): ICD-10-CM

## 2019-05-06 PROCEDURE — 92134 CPTRZ OPH DX IMG PST SGM RTA: CPT | Mod: ZF | Performed by: OPHTHALMOLOGY

## 2019-05-06 PROCEDURE — G0463 HOSPITAL OUTPT CLINIC VISIT: HCPCS | Mod: ZF

## 2019-05-06 ASSESSMENT — REFRACTION_WEARINGRX
OS_AXIS: 020
OS_ADD: +2.50
OD_SPHERE: -0.75
SPECS_TYPE: PAL
OS_CYLINDER: +2.25
OD_AXIS: 010
OD_ADD: +2.50
OD_CYLINDER: +1.25
OS_SPHERE: -2.25

## 2019-05-06 ASSESSMENT — VISUAL ACUITY
OS_CC: 5/200E
CORRECTION_TYPE: GLASSES
METHOD: SNELLEN - LINEAR

## 2019-05-06 ASSESSMENT — CONF VISUAL FIELD
OD_INFERIOR_TEMPORAL_RESTRICTION: 3
OS_INFERIOR_TEMPORAL_RESTRICTION: 3
OS_INFERIOR_NASAL_RESTRICTION: 3
OS_SUPERIOR_TEMPORAL_RESTRICTION: 1
OD_SUPERIOR_NASAL_RESTRICTION: 3
OS_SUPERIOR_NASAL_RESTRICTION: 1
OD_SUPERIOR_TEMPORAL_RESTRICTION: 3
OD_INFERIOR_NASAL_RESTRICTION: 3

## 2019-05-06 ASSESSMENT — TONOMETRY
OD_IOP_MMHG: 15
OS_IOP_MMHG: 20
IOP_METHOD: TONOPEN

## 2019-05-06 ASSESSMENT — SLIT LAMP EXAM - LIDS
COMMENTS: DERMATOCHALASIS
COMMENTS: DERMATOCHALASIS

## 2019-05-06 ASSESSMENT — CUP TO DISC RATIO
OS_RATIO: SMALL
OD_RATIO: 0.6

## 2019-05-06 NOTE — TELEPHONE ENCOUNTER
[facility] Call Center    Phone Message    May a detailed message be left on voicemail: yes    Reason for Call: Other: Kala was seen by Dr. Morley and her eye pressure was measured at 20.  Basilio would like to know if the laser procedure should be postponed or cancelled due to today's reading? Would Dr. Grossman like to postpone or re-evaluate?    Action Taken: Message routed to:  [facility] p opt

## 2019-05-06 NOTE — TELEPHONE ENCOUNTER
Spoke with patient's son (POA), and stated Dr. Grossman is back Wed, May 8 and will discuss at that time.   Teodora Severino COA 5:00 PM May 6, 2019

## 2019-05-06 NOTE — NURSING NOTE
Chief Complaints and History of Present Illnesses   Patient presents with     Follow Up     5 week follow up Combined CRVO/CRAO OS with NVG     Chief Complaint(s) and History of Present Illness(es)     Follow Up     Comments: 5 week follow up Combined CRVO/CRAO OS with NVG              Comments     Pt states vision is the same as last visit. No eye pain today.   Pt still seeing floaters, no changes.    Kathy SELF May 6, 2019 10:06 AM

## 2019-05-06 NOTE — PROGRESS NOTES
CC -   CRVO follow up    INTERVAL HISTORY - Patient denies changes since last visit.       HPI -   Kala Pñea is a 89 year old year-old monocular (right eye baseline 20/500 due to AMD, no prior injections/ laser) patient with history of OAG, AMD, recent CRVO LEFT eye.  Seen 1/18/19 [facility] by Dr Salcedo for vision loss & reddish tinge OS that had begun few weeks prior with progression (~1/1/19 onset).  Initial IOP OS 40 on 1/18/19, seen by Dr. Black was 1/19/19, started on gtts.  Given Avastin #1 1/21/19  Had prior eye exam 11/30/19 for baseline glaucoma was on xalatan and Cosopt baseline, added alphagan with latest IOP rise after CRVO  H/o valvular insufficiency, no h/o CVA/TIA, had cardiac U/S per son ~2017    GTTS  Cosopt - 1 drop BID each eye   Latanoprost - 1 drop at bedtime OU  Alphagan -1 drop TID left eye (x 2 months)      PAST OCULAR SURGERY  CE/IOL OU ~ 1999      RETINAL IMAGING:  OCT 5-6-19  OD - Geographic atrophy, moderate drusen, no IRF/SRF, PVD, appears stable  OS - Moderate drusen, retinal thickening, trace IRF, tr ERM, PVD, appear stable    FA 2-15-19  OD - staining in area of macular atrophy  OS - (transit) Delayed arterial filling and delayed AV transit, blockage in area of heme      ASSESSMENT & PLAN    1.  Combined CRVO/CRAO OS with NVG   - ?onset ~ 12/2018   - had routine eye exam 11/30/18 not present    - strongly suspect CRAO based on exam and imaging   - ischemic with NVG   - minimal CME not likely visually significant   - s/p Avastin 1-21-19 & 2/15/19 (3 months)   - s/p PRP 2/15/19 (3 months)       - NVI regressed      2.  CRAO OS   - strongly suspect CRAO based on exam and imaging   - most likely d/t CRVO   - no GCA symptoms, clinical suspicion low   - had cardiac U/S 2 years ago (~2017) per son   - could consider carotid U/S   - d/w patient & son declines imaging or blood tests   - wishes minimal interventions or visits      3. NVG OS   - see #1 for injections/laser   - IOP OK  today on gtts   - seeing Ngoc, possible laser Tx   - patient may need a tube at some point though family wishes minimal intervention   - sees Dr. Salcedo locally      4. Dry AMD OD with GA   - advanced, vision loss   - refer to low vision      5. PVD OU      RTC 1-2 month OCT OU            ATTESTATION     Attending Physician Attestation:      Complete documentation of historical and exam elements from today's encounter can be found in the full encounter summary report (not reduplicated in this progress note).  I personally obtained the chief complaint(s) and history of present illness.  I confirmed and edited as necessary the review of systems, past medical/surgical history, family history, social history, and examination findings as documented by others; and I examined the patient myself.  I personally reviewed the relevant tests, images, and reports as documented above.  I formulated and edited as necessary the assessment and plan and discussed the findings and management plan with the patient and family    Tere Morley MD, PhD  , Vitreoretinal Surgery  Department of Ophthalmology  [facility]

## 2019-05-21 ENCOUNTER — ANESTHESIA EVENT (OUTPATIENT)
Dept: SURGERY | Facility: AMBULATORY SURGERY CENTER | Age: 84
End: 2019-05-21

## 2019-05-22 ENCOUNTER — HOSPITAL ENCOUNTER (OUTPATIENT)
Facility: AMBULATORY SURGERY CENTER | Age: 84
End: 2019-05-22
Attending: OPHTHALMOLOGY
Payer: MEDICARE

## 2019-05-22 ENCOUNTER — ANESTHESIA (OUTPATIENT)
Dept: SURGERY | Facility: AMBULATORY SURGERY CENTER | Age: 84
End: 2019-05-22

## 2019-05-22 VITALS
DIASTOLIC BLOOD PRESSURE: 82 MMHG | SYSTOLIC BLOOD PRESSURE: 161 MMHG | OXYGEN SATURATION: 98 % | RESPIRATION RATE: 16 BRPM | TEMPERATURE: 98.1 F | HEART RATE: 59 BPM | BODY MASS INDEX: 23 KG/M2 | HEIGHT: 62 IN | WEIGHT: 125 LBS

## 2019-05-22 DIAGNOSIS — H40.52X3 NEOVASCULAR GLAUCOMA OF LEFT EYE, SEVERE STAGE: Primary | ICD-10-CM

## 2019-05-22 RX ORDER — MEPERIDINE HYDROCHLORIDE 25 MG/ML
12.5 INJECTION INTRAMUSCULAR; INTRAVENOUS; SUBCUTANEOUS
Status: DISCONTINUED | OUTPATIENT
Start: 2019-05-22 | End: 2019-05-23 | Stop reason: HOSPADM

## 2019-05-22 RX ORDER — BALANCED SALT SOLUTION 6.4; .75; .48; .3; 3.9; 1.7 MG/ML; MG/ML; MG/ML; MG/ML; MG/ML; MG/ML
SOLUTION OPHTHALMIC PRN
Status: DISCONTINUED | OUTPATIENT
Start: 2019-05-22 | End: 2019-05-22 | Stop reason: HOSPADM

## 2019-05-22 RX ORDER — NALOXONE HYDROCHLORIDE 0.4 MG/ML
.1-.4 INJECTION, SOLUTION INTRAMUSCULAR; INTRAVENOUS; SUBCUTANEOUS
Status: DISCONTINUED | OUTPATIENT
Start: 2019-05-22 | End: 2019-05-23 | Stop reason: HOSPADM

## 2019-05-22 RX ORDER — PROPOFOL 10 MG/ML
INJECTION, EMULSION INTRAVENOUS PRN
Status: DISCONTINUED | OUTPATIENT
Start: 2019-05-22 | End: 2019-05-22

## 2019-05-22 RX ORDER — SODIUM CHLORIDE, SODIUM LACTATE, POTASSIUM CHLORIDE, CALCIUM CHLORIDE 600; 310; 30; 20 MG/100ML; MG/100ML; MG/100ML; MG/100ML
INJECTION, SOLUTION INTRAVENOUS CONTINUOUS
Status: DISCONTINUED | OUTPATIENT
Start: 2019-05-22 | End: 2019-05-23 | Stop reason: HOSPADM

## 2019-05-22 RX ORDER — ONDANSETRON 2 MG/ML
4 INJECTION INTRAMUSCULAR; INTRAVENOUS EVERY 30 MIN PRN
Status: DISCONTINUED | OUTPATIENT
Start: 2019-05-22 | End: 2019-05-23 | Stop reason: HOSPADM

## 2019-05-22 RX ORDER — PREDNISOLONE ACETATE 10 MG/ML
1 SUSPENSION/ DROPS OPHTHALMIC 4 TIMES DAILY
Qty: 5 ML | Refills: 0 | Status: SHIPPED | OUTPATIENT
Start: 2019-05-22 | End: 2019-06-18

## 2019-05-22 RX ORDER — LIDOCAINE HYDROCHLORIDE 20 MG/ML
INJECTION, SOLUTION INFILTRATION; PERINEURAL PRN
Status: DISCONTINUED | OUTPATIENT
Start: 2019-05-22 | End: 2019-05-22

## 2019-05-22 RX ORDER — ACETAMINOPHEN 325 MG/1
975 TABLET ORAL ONCE
Status: DISCONTINUED | OUTPATIENT
Start: 2019-05-22 | End: 2019-05-22 | Stop reason: HOSPADM

## 2019-05-22 RX ORDER — FENTANYL CITRATE 50 UG/ML
25-50 INJECTION, SOLUTION INTRAMUSCULAR; INTRAVENOUS
Status: DISCONTINUED | OUTPATIENT
Start: 2019-05-22 | End: 2019-05-22 | Stop reason: HOSPADM

## 2019-05-22 RX ORDER — SODIUM CHLORIDE, SODIUM LACTATE, POTASSIUM CHLORIDE, CALCIUM CHLORIDE 600; 310; 30; 20 MG/100ML; MG/100ML; MG/100ML; MG/100ML
INJECTION, SOLUTION INTRAVENOUS CONTINUOUS
Status: DISCONTINUED | OUTPATIENT
Start: 2019-05-22 | End: 2019-05-22 | Stop reason: HOSPADM

## 2019-05-22 RX ORDER — ONDANSETRON 4 MG/1
4 TABLET, ORALLY DISINTEGRATING ORAL EVERY 30 MIN PRN
Status: DISCONTINUED | OUTPATIENT
Start: 2019-05-22 | End: 2019-05-23 | Stop reason: HOSPADM

## 2019-05-22 RX ORDER — OXYCODONE HYDROCHLORIDE 5 MG/1
5 TABLET ORAL EVERY 4 HOURS PRN
Status: DISCONTINUED | OUTPATIENT
Start: 2019-05-22 | End: 2019-05-23 | Stop reason: HOSPADM

## 2019-05-22 RX ORDER — GABAPENTIN 100 MG/1
100 CAPSULE ORAL ONCE
Status: COMPLETED | OUTPATIENT
Start: 2019-05-22 | End: 2019-05-22

## 2019-05-22 RX ADMIN — PROPOFOL 10 MG: 10 INJECTION, EMULSION INTRAVENOUS at 11:44

## 2019-05-22 RX ADMIN — SODIUM CHLORIDE, SODIUM LACTATE, POTASSIUM CHLORIDE, CALCIUM CHLORIDE: 600; 310; 30; 20 INJECTION, SOLUTION INTRAVENOUS at 10:02

## 2019-05-22 RX ADMIN — GABAPENTIN 100 MG: 100 CAPSULE ORAL at 10:01

## 2019-05-22 RX ADMIN — PROPOFOL 20 MG: 10 INJECTION, EMULSION INTRAVENOUS at 11:48

## 2019-05-22 RX ADMIN — LIDOCAINE HYDROCHLORIDE 50 MG: 20 INJECTION, SOLUTION INFILTRATION; PERINEURAL at 11:41

## 2019-05-22 ASSESSMENT — MIFFLIN-ST. JEOR: SCORE: 915.25

## 2019-05-22 ASSESSMENT — ENCOUNTER SYMPTOMS: DYSRHYTHMIAS: 1

## 2019-05-22 NOTE — DISCHARGE INSTRUCTIONS
"[facility]  Home Care Following Anesthesia  For 24 hours after surgery:  1. Get plenty of rest.  A responsible adult must stay with you for at least 24 hours after you leave the surgery center.  2. Do not drive or use heavy equipment.  If you have weakness or tingling, don't drive or use heavy equipment until this feeling goes away.   3. Do not drink alcohol.   4. Avoid strenuous or risky activities.  Ask for help when climbing stairs.  5. You may feel lightheaded.  IF so, sit for a few minutes before standing.  Have someone help you get up.   6. If you have nausea (feel sick to your stomach): Drink only clear liquids such as apple juice, ginger ale, broth or 7-Up.  Rest may also help.  Be sure to drink enough fluids.  Move to a regular diet as you feel able.   7. You may have a slight fever.  Call the doctor if your fever is over 100 F (37.7 C) (taken under the tongue) or lasts longer than 24 hours.  8. You may have a dry mouth, a sore throat, muscle aches or trouble sleeping. These should go away after 24 hours.  9. Do not make important or legal decisions.        Today you received a Marcaine or bupivacaine block to numb the nerves near your surgery site.  This is a block using local anesthetic or \"numbing\" medication injected around the nerves to anesthetize or \"numb\" the area supplied by those nerves.  This block is injected into the muscle layer near your surgical site.  The medication may numb the location where you had surgery for 6-18 hours, but may last up to 24 hours.  If your surgical site is an arm or leg you should be careful with your affected limb, since it is possible to injure your limb without being aware of it due to the numbing.  Until full feeling returns, you should guard against bumping or hitting your limb, and avoid extreme hot or cold temperatures on the skin.  As the block wears off, the feeling will return as a tingling or prickly sensation near your " surgical site.  You will experience more discomfort from your incision as the feeling returns.  You may want to take a pain pill (a narcotic or Tylenol if this was prescribed by your surgeon) when you start to experience mild pain before the pain beccomes more severe.  If your pain medications do not control your pain you should notifiy your surgeon.    Tips for taking pain medications  To get the best pain relief possible, remember these points:    Take pain medications as directed, before pain becomes severe.    Pain medication can upset your stomach: taking it with food may help.    Constipation is a common side effect of pain medication. Drink plenty of  fluids.    Eat foods high in fiber. Take a stool softener if recommended by your doctor or pharmacist.    Do not drink alcohol, drive or operate machinery while taking pain medications.    Ask about other ways to control pain, such as with heat, ice or relaxation.    Tylenol/Acetaminophen Consumption  To help encourage the safe use of acetaminophen, the makers of TYLENOL  have lowered the maximum daily dose for single-ingredient Extra Strength TYLENOL  (acetaminophen) products sold in the [place] from 8 pills per day (4,000 mg) to 6 pills per day (3,000 mg). The dosing interval has also changed from 2 pills every 4-6 hours to 2 pills every 6 hours.    If you feel your pain relief is insufficient, you may take Tylenol/Acetaminophen in addition to your narcotic pain medication.     Be careful not to exceed 3,000 mg of Tylenol/Acetaminophen in a 24 hour period from all sources.    If you are taking extra strength Tylenol/acetaminophen (500 mg), the maximum dose is 6 tablets in 24 hours.    If you are taking regular strength acetaminophen (325 mg), the maximum dose is 9 tablets in 24 hours.    Call a doctor for any of the followin. Signs of infection (fever, growing tenderness at the surgery site, a large amount of drainage or bleeding, severe pain, foul-smelling  drainage, redness, swelling).  2. It has been over 8 to 10 hours since surgery and you are still not able to urinate (pass water).  3. Headache for over 24 hours.  Your doctor is:  Dr. Geena Grossman, Opthalmology: [phone]  Or dial [phone] and ask for the resident on call for:  Ophthalmology  For emergency care, call the:  [place] [facility]:  [phone] (TTY for hearing impaired: [phone])      Discharge Instructions after Diode Coagulation or Micropulse (Laser Eye Surgery)  Dr. Geena Grossman & Dr. Kenya Gillis      Take your post-operative drops according to the instructions      Leave eye patch on until follow-up appointment tomorrow.      Approved activities (OK to do the following):    Please clean around the eye(s) gently with tissue or washcloth    Leaning over the sink to brush your teeth    Going up and down stairs    Walking for exercise    Watching TV or reading    After your clinic appointment tomorrow, you may shower, including putting your head under the shower, making sure to close your eyes    Call for any problems or questions [phone]    There is always someone on call, even on weekends.

## 2019-05-22 NOTE — ANESTHESIA PREPROCEDURE EVALUATION
"Anesthesia Pre-Procedure Evaluation    Patient: Kala Peña   MRN:     [id number] Gender:   female   Age:    89 year old :      1923        Preoperative Diagnosis: Glaucoma   Procedure(s):  Left Eye Micropulse Cyclophotocoagulation     Past Medical History:   Diagnosis Date     Complication of anesthesia     \"hard time waking up\"      History of hip surgery      PONV (postoperative nausea and vomiting)       Past Surgical History:   Procedure Laterality Date     CATARACT IOL, RT/LT            Anesthesia Evaluation     . Pt has had prior anesthetic.     No history of anesthetic complications          ROS/MED HX    ENT/Pulmonary:  - neg pulmonary ROS     Neurologic:  - neg neurologic ROS     Cardiovascular: Comment: INR 3.4 at H&P in last few days. Warfarin dose has been being adjusted. Did not hold.     (+) Dyslipidemia, hypertension----. Taking blood thinners : . . . :. dysrhythmias a-fib, .       METS/Exercise Tolerance:     Hematologic:  - neg hematologic  ROS       Musculoskeletal:  - neg musculoskeletal ROS       GI/Hepatic:  - neg GI/hepatic ROS       Renal/Genitourinary:         Endo:     (+) thyroid problem .      Psychiatric:  - neg psychiatric ROS       Infectious Disease:  - neg infectious disease ROS       Malignancy:      - no malignancy   Other:                         PHYSICAL EXAM:   Mental Status/Neuro:    Airway: Facies: Feasible  Mallampati: II  Mouth/Opening: Full  TM distance: > 6 cm  Neck ROM: Full   Respiratory:    CV:    Comments:                    No results found for: WBC, HGB, HCT, PLT, CRP, SED, NA, POTASSIUM, CHLORIDE, CO2, BUN, CR, GLC, SUSIE, PHOS, MAG, ALBUMIN, PROTTOTAL, ALT, AST, GGT, ALKPHOS, BILITOTAL, BILIDIRECT, LIPASE, AMYLASE, HALEY, PTT, INR, FIBR, TSH, T4, T3, HCG, HCGS, CKTOTAL, CKMB, TROPN    Preop Vitals  BP Readings from Last 3 Encounters:   19 162/73    Pulse Readings from Last 3 Encounters:   19 59      Resp Readings from Last 3 Encounters: " "  05/22/19 16    SpO2 Readings from Last 3 Encounters:   05/22/19 98%      Temp Readings from Last 1 Encounters:   05/22/19 36.8  C (98.3  F) (Oral)    Ht Readings from Last 1 Encounters:   05/22/19 1.575 m (5' 2\")      Wt Readings from Last 1 Encounters:   05/22/19 56.7 kg (125 lb)    Estimated body mass index is 22.86 kg/m  as calculated from the following:    Height as of this encounter: 1.575 m (5' 2\").    Weight as of this encounter: 56.7 kg (125 lb).     LDA:  Peripheral IV 05/22/19 Right Hand (Active)   Site Assessment WDL 5/22/2019 10:00 AM   Line Status Infusing 5/22/2019 10:00 AM   Phlebitis Scale 0-->no symptoms 5/22/2019 10:00 AM   Infiltration Scale 0 5/22/2019 10:00 AM   Number of days: 0            Assessment:   ASA SCORE: 2       Documentation: H&P complete; Preop Testing complete; Consents complete   Proceeding: Proceed without further delay  Tobacco Use:  NO Active use of Tobacco/UNKNOWN Tobacco use status     Plan:   Anes. Type:  General   Pre-Induction: Midazolam IV; Acetaminophen PO   Induction:  IV (Standard)   Airway: Oral ETT   Access/Monitoring: PIV   Maintenance: Balanced   Emergence: Procedure Site   Logistics: Same Day Surgery     Postop Pain/Sedation Strategy:  Standard-Options: Opioids PRN     PONV Management:  Adult Risk Factors: Female, H/o PONV or Motion Sickness, Non-Smoker, Postop Opioids     CONSENT: Direct conversation   Plan and risks discussed with: Patient; Other                            Riana Dey MD  "

## 2019-05-22 NOTE — ANESTHESIA CARE TRANSFER NOTE
Patient: Kala Peña    Procedure(s):  Left Eye Micropulse Cyclophotocoagulation    Diagnosis: Glaucoma  Diagnosis Additional Information: No value filed.    Anesthesia Type:   No value filed.     Note:  Airway :Room Air  Patient transferred to:Phase II  Handoff Report: Identifed the Patient, Identified the Reponsible Provider, Reviewed the pertinent medical history, Discussed the surgical course, Reviewed Intra-OP anesthesia mangement and issues during anesthesia, Set expectations for post-procedure period and Allowed opportunity for questions and acknowledgement of understanding      Vitals: (Last set prior to Anesthesia Care Transfer)    CR[facility] VITALS  5/22/2019 1126 - 5/22/2019 1158      5/22/2019             Resp Rate (set):  10        Spo2 96%   RR 14          Electronically Signed By: LETICIA Rivera CRNA  May 22, 2019  11:58 AM

## 2019-05-22 NOTE — ANESTHESIA POSTPROCEDURE EVALUATION
Anesthesia POST Procedure Evaluation    Patient: Kala Peña   MRN:     [id number] Gender:   female   Age:    89 year old :      1923        Preoperative Diagnosis: Glaucoma   Procedure(s):  Left Eye Micropulse Cyclophotocoagulation   Postop Comments: No value filed.       Anesthesia Type:  General  No value filed.    Reportable Event: NO     PAIN: Uncomplicated   Sign Out status: Comfortable, Well controlled pain     PONV: No PONV   Sign Out status:  No Nausea or Vomiting     Neuro/Psych: Uneventful perioperative course   Sign Out Status: Preoperative baseline; Age appropriate mentation     Airway/Resp.: Uneventful perioperative course   Sign Out Status: Non labored breathing, age appropriate RR; Resp. Status within EXPECTED Parameters     CV: Uneventful perioperative course   Sign Out status: Appropriate BP and perfusion indices; Appropriate HR/Rhythm     Disposition:   Sign Out in:  PACU  Disposition:  Phase II; Home  Recovery Course: Uneventful  Follow-Up: Not required           Last Anesthesia Record Vitals:  [facility] VITALS  2019 1126 - 2019 1205      2019             Resp Rate (set):  10          Last PACU Vitals:  Vitals Value Taken Time   BP     Temp     Pulse     Resp     SpO2     Temp src Available 2019 11:50 AM   NIBP 139/61 2019 11:51 AM   Pulse 51 2019 11:55 AM   SpO2 95 % 2019 11:55 AM   Resp     Temp     Ht Rate 51 2019 11:55 AM   Temp 2           Electronically Signed By: Riana Dey MD, May 22, 2019, 12:05 PM

## 2019-05-22 NOTE — OP NOTE
Pre-operative diagnosis: Left Eye Glaucoma   Post-operative diagnosis Same   Procedure:    Anesthesia: Procedure(s):  Micropulse Cyclophotocoagulation, Left  Retrobulbar Block   Surgeon: Geena Grossman MD   Assistants(s): None   Estimated blood loss: Minimal                         Specimens: None   Findings:  Complications: None  None       Indication: Patient was noted to have glaucoma with uncontrolled intraocular pressures despite maximally tolerated medical therapy.     After informed consent was obtained, the patient was wheeled to the operative suite. Preoperatively the patient received a retrobulbar block consisting of 2% Lidocaine under propofol anesthesia. Patient was then prepped and draped in the usual fashion. A lid speculum was placed between the upper and lower eyelids. Micropulse trans-scleral cyclophotocoagulation was performed using a diode laser using 2000mJ and a duration of 90 seconds per hemifield sparing the 9 and 3 o'clock locations. The patient then had Maxitrol ophthalmic ointment applied to the ocular surface of the operative eye as well as a pressure patch. The patient was wheeled to the recovery area in stable condition.

## 2019-05-23 ENCOUNTER — TELEPHONE (OUTPATIENT)
Dept: OPHTHALMOLOGY | Facility: CLINIC | Age: 84
End: 2019-05-23

## 2019-05-23 NOTE — TELEPHONE ENCOUNTER
Scheduled same day as Dr. Grossman June 18th in open 3:10 PM slot with Dr. Morley  Reviewed would work her in earlier once appt complete with Dr Grossman    Note to cherise Amador RN 10:41 AM 05/23/19        [facility] Call Center    Phone Message    May a detailed message be left on voicemail: yes    Reason for Call: Other: Pt son is wondering if they can reschedule Milli's appt to the same day that they see Ngoc on 06/18 OR reschedule post op appt to Milli's appt day if possible. Transportation with wheel chair is tricky and costly for them and would prefer to have it on the same day of the time slot is close enough together. Please f/u with son.     Action Taken: Message routed to:  [facility] ([facility] eye

## 2019-06-17 NOTE — PROGRESS NOTES
CC -   CRVO follow up    INTERVAL HISTORY - Patient denies changes since last visit.       HPI -   Kala Peña is a 89 year old year-old monocular (right eye baseline 20/500 due to AMD, no prior injections/ laser) patient with history of OAG, AMD, recent CRVO LEFT eye.  Seen 1/18/19 [facility] by Dr Salcedo for vision loss & reddish tinge OS that had begun few weeks prior with progression (~1/1/19 onset).  Initial IOP OS 40 on 1/18/19, seen by Dr. Black was 1/19/19, started on gtts.  Given Avastin #1 1/21/19  Had prior eye exam 11/30/19 for baseline glaucoma was on xalatan and Cosopt baseline, added alphagan with latest IOP rise after CRVO  H/o valvular insufficiency, no h/o CVA/TIA, had cardiac U/S per son ~2017      PAST OCULAR SURGERY  CE/IOL OU ~ 1999  PRP OS 2/2019      RETINAL IMAGING:  OCT 6-18-19  OD - Geographic atrophy, moderate drusen, no IRF/SRF, PVD, appears stable  OS - Moderate drusen, retinal thickening, trace IRF, tr ERM, PVD, appear stable    FA 2-15-19  OD - staining in area of macular atrophy  OS - (transit) Delayed arterial filling and delayed AV transit, blockage in area of heme      ASSESSMENT & PLAN    1.  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  CRAO OS   - strongly suspect CRAO based on exam and imaging   - most likely d/t CRVO   - no GCA symptoms, clinical suspicion low   - had cardiac U/S (~2017) per son   - could consider carotid U/S   - d/w patient & son declines imaging or blood tests   - wishes minimal interventions  or visits      3. NVG OS   - see #1 for injections/laser   - sees Ngoc Salcedo locally     - if have tube would advise Avastin prior to tube and PRP augmentation      4. Dry AMD OD with GA   - advanced, vision loss   - refer to low vision - declines intervention      5. PVD OU      RTC 3 month OCT OU            ATTESTATION     Attending Physician Attestation:      Complete documentation of historical and exam elements from today's encounter can be found in the full encounter summary report (not reduplicated in this progress note).  I personally obtained the chief complaint(s) and history of present illness.  I confirmed and edited as necessary the review of systems, past medical/surgical history, family history, social history, and examination findings as documented by others; and I examined the patient myself.  I personally reviewed the relevant tests, images, and reports as documented above.  I formulated and edited as necessary the assessment and plan and discussed the findings and management plan with the patient and family    Tere Morley MD, PhD  , Vitreoretinal Surgery  Department of Ophthalmology  [facility]

## 2019-06-18 ENCOUNTER — OFFICE VISIT (OUTPATIENT)
Dept: OPHTHALMOLOGY | Facility: CLINIC | Age: 84
End: 2019-06-18
Attending: OPHTHALMOLOGY
Payer: MEDICARE

## 2019-06-18 DIAGNOSIS — Z96.1 PSEUDOPHAKIA OF BOTH EYES: ICD-10-CM

## 2019-06-18 DIAGNOSIS — H34.8121 CENTRAL RETINAL VEIN OCCLUSION WITH NEOVASCULARIZATION OF LEFT EYE (H): ICD-10-CM

## 2019-06-18 DIAGNOSIS — H34.8120 CENTRAL RETINAL VEIN OCCLUSION WITH MACULAR EDEMA OF LEFT EYE (H): ICD-10-CM

## 2019-06-18 DIAGNOSIS — H40.52X3 NEOVASCULAR GLAUCOMA OF LEFT EYE, SEVERE STAGE: Primary | ICD-10-CM

## 2019-06-18 PROCEDURE — 40000269 ZZH STATISTIC NO CHARGE FACILITY FEE: Mod: ZF

## 2019-06-18 PROCEDURE — 92134 CPTRZ OPH DX IMG PST SGM RTA: CPT | Mod: ZF | Performed by: OPHTHALMOLOGY

## 2019-06-18 PROCEDURE — G0463 HOSPITAL OUTPT CLINIC VISIT: HCPCS | Mod: ZF

## 2019-06-18 ASSESSMENT — VISUAL ACUITY
OS_CC: 10/200E
OD_PH_CC: 20/100
OD_PH_CC: 20/100
OD_PH_CC+: +1
CORRECTION_TYPE: GLASSES
CORRECTION_TYPE: GLASSES
OD_CC: 20/100 SEARCHING
METHOD: SNELLEN - LINEAR
OD_PH_CC+: +1
OD_CC: 20/100 SEARCHING
METHOD: SNELLEN - LINEAR
OS_CC: 10/200E

## 2019-06-18 ASSESSMENT — CONF VISUAL FIELD
OD_INFERIOR_NASAL_RESTRICTION: 3
OD_INFERIOR_NASAL_RESTRICTION: 3
OS_INFERIOR_NASAL_RESTRICTION: 1
OS_SUPERIOR_TEMPORAL_RESTRICTION: 3
OS_SUPERIOR_NASAL_RESTRICTION: 3
OS_INFERIOR_TEMPORAL_RESTRICTION: 1
OD_SUPERIOR_NASAL_RESTRICTION: 3
METHOD: COUNTING FINGERS
OD_SUPERIOR_NASAL_RESTRICTION: 3
OS_INFERIOR_NASAL_RESTRICTION: 1
OS_SUPERIOR_TEMPORAL_RESTRICTION: 3
OS_INFERIOR_TEMPORAL_RESTRICTION: 1
OS_SUPERIOR_NASAL_RESTRICTION: 3
METHOD: COUNTING FINGERS

## 2019-06-18 ASSESSMENT — REFRACTION_WEARINGRX
OD_CYLINDER: +1.25
OD_ADD: +2.50
SPECS_TYPE: PAL
OD_AXIS: 010
OS_CYLINDER: +2.25
OD_SPHERE: -0.75
OS_AXIS: 020
OS_SPHERE: -2.25
OD_SPHERE: -0.75
OD_CYLINDER: +1.25
OS_ADD: +2.50
OD_ADD: +2.50
OS_ADD: +2.50
OS_SPHERE: -2.25
OD_AXIS: 010
OS_CYLINDER: +2.25
SPECS_TYPE: PAL
OS_AXIS: 020

## 2019-06-18 ASSESSMENT — TONOMETRY
IOP_METHOD: APPLANATION
IOP_METHOD: APPLANATION
OD_IOP_MMHG: 15
OD_IOP_MMHG: 15

## 2019-06-18 ASSESSMENT — CUP TO DISC RATIO
OS_RATIO: SMALL
OD_RATIO: 0.6

## 2019-06-18 ASSESSMENT — SLIT LAMP EXAM - LIDS
COMMENTS: DERMATOCHALASIS

## 2019-06-18 NOTE — NURSING NOTE
Chief Complaint(s) and History of Present Illness(es)     Post Op (Ophthalmology) Left Eye     In left eye.  Associated symptoms include eye pain and redness (LE still red since surgery 1 month ago. ).  Negative for dryness and tearing.              Comments     Pt is here for a 1 month post op s/p CPC LE. Pt notes intermittent slight (very slight) discomfort of LE x surgery 1 month ago. LE vision is very blurry still. Pt's ocular meds are: Cosopt - 1 drop BID each eye   Latanoprost - 1 drop at bedtime OU  Alphagan -1 drop TID left eye   Systane prn each eye  Systane gel at bedtime each eye     CLAIR Dawson 12:17 PM June 18, 2019

## 2019-06-18 NOTE — NURSING NOTE
Chief Complaint(s) and History of Present Illness(es)     Follow Up     In left eye.  Associated symptoms include eye pain and redness.  Negative for dryness and tearing.              Comments     Pt is here for a 1 month f/u for Combined CRVO/CRAO OS with NVG. Pt notes intermittent slight (very slight) discomfort of LE x surgery 1 month ago with Dr. Grossman. LE vision is very blurry still. Pt's ocular meds are: Cosopt - 1 drop BID each eye   Latanoprost - 1 drop at bedtime OU  Alphagan -1 drop TID left eye   Systane prn each eye  Systane gel at bedtime each eye     CLAIR Dawson 12:17 PM June 18, 2019

## 2019-06-18 NOTE — PATIENT INSTRUCTIONS
Patient will continue on Cosopt (Timolol/Dorzolamide) which is a blue top drop 2x/day (12 hours apart) in BOTH EYES, Latanoprost which is a teal top drop at bedtime in BOTH EYES and Alphagan (Brimonidine) which is a purple top drop 3x/day (8 hours apart) in the LEFT EYE ONLY.  A long discussion of the risks, benefits, and alternatives including potential treatment and management options were had with patient and a decision was made to think about options prior to proceeding with either the tube surgery or the G-probe diode CPC (laser) in the left eye.  Patient will return to clinic in 3-4 weeks with repeat IOP check if she declines surgery.

## 2019-06-18 NOTE — Clinical Note
Dilip Seay,If you plan tube OS, I would suggest have Avastin in clinic 1 week before (with AC tap) and PRP augmentation at time of tube placement.Tere

## 2019-06-18 NOTE — PROGRESS NOTES
1)?POAG OD/NVG OS -- s/p mCPC OS (5/22/19), started on drops prior to moving to [facility], possibly started on gtts in ?2010 while in [place] (following with Dr. Pang)  --  K pachy: 552/558   Tmax:  OS:40     HVF:  Unable    CDR:0.6/0.8     HRT/OCT:       FHX of Glc:    Gonio:       Intolerant to:      Asthma/COPD:  No, tolerating topical and po BB Steroid Use: No     Kidney Stones:No     Sulfa Allergy: No     IOP targets:  2)H/O ?combined CRAO/CRVO OS s/p Avastin and PRP -- following with Dr. Morley  3)AMD with GA OU -- following with Dr. quintanilla  4)PCIOL OU -- was following with Dr. Salcedo at [facility]  5)H/O Afib -- on arias FERNANDEZ:son, Basilio Peña ([phone]), is POA    MD:pts last INR was over 6 -- needs improvement prior to surgery    MD:getting patient to appointments is challenging    Patient will continue on Cosopt (Timolol/Dorzolamide) which is a blue top drop 2x/day (12 hours apart) in BOTH EYES, Latanoprost which is a teal top drop at bedtime in BOTH EYES and Alphagan (Brimonidine) which is a purple top drop 3x/day (8 hours apart) in the LEFT EYE ONLY.  A long discussion of the risks, benefits, and alternatives including potential treatment and management options were had with patient and a decision was made to think about options prior to proceeding with either the tube surgery or the G-probe diode CPC (laser) in the left eye.  Patient will return to clinic in 3-4 weeks with repeat IOP check if she declines surgery.    Attending Physician Attestation:  Complete documentation of historical and exam elements from today's encounter can be found in the full encounter summary report (not reduplicated in this progress note). I personally obtained the chief complaint(s) and history of present illness.  I confirmed and edited as necessary the review of systems, past medical/surgical history, family history, social history, and examination findings as documented by others; and I examined the patient myself. I  personally reviewed the relevant tests, images, and reports as documented above. I formulated and edited as necessary the assessment and plan and discussed the findings and management plan with the patient and family.  - Geena Grossman MD

## 2019-06-25 ENCOUNTER — TELEPHONE (OUTPATIENT)
Dept: OPHTHALMOLOGY | Facility: CLINIC | Age: 84
End: 2019-06-25

## 2019-06-25 NOTE — TELEPHONE ENCOUNTER
Called pt's son per in[employer]avorite [employer] message, discussed options with family and they decided to let eye go dark. Pt's son confirmed that the transportation getting to and from appointments is difficult given pt age, and wanted to check to see if Dr. Consuelo Salcedo was okay for the IOP checks in the meantime. Confirmed with Dr. Grossman this is an acceptable response, and he confirmed. Pt's son was given the Glaucoma desk number and told to call with any patient pain/discomfort. Will call back with any questions/concerns.  Teodora Severino COA 11:05 AM June 25, 2019

## 2020-01-30 ENCOUNTER — RECORDS - HEALTHEAST (OUTPATIENT)
Dept: LAB | Facility: CLINIC | Age: 85
End: 2020-01-30

## 2020-01-30 LAB
ALBUMIN SERPL-MCNC: 3.5 G/DL (ref 3.5–5)
ALP SERPL-CCNC: 74 U/L (ref 45–120)
ALT SERPL W P-5'-P-CCNC: 17 U/L (ref 0–45)
ANION GAP SERPL CALCULATED.3IONS-SCNC: 7 MMOL/L (ref 5–18)
AST SERPL W P-5'-P-CCNC: 16 U/L (ref 0–40)
BASOPHILS # BLD AUTO: 0 THOU/UL (ref 0–0.2)
BASOPHILS NFR BLD AUTO: 0 % (ref 0–2)
BILIRUB SERPL-MCNC: 0.4 MG/DL (ref 0–1)
BUN SERPL-MCNC: 11 MG/DL (ref 8–28)
CALCIUM SERPL-MCNC: 8.6 MG/DL (ref 8.5–10.5)
CHLORIDE BLD-SCNC: 106 MMOL/L (ref 98–107)
CO2 SERPL-SCNC: 25 MMOL/L (ref 22–31)
CREAT SERPL-MCNC: 0.72 MG/DL (ref 0.6–1.1)
EOSINOPHIL # BLD AUTO: 0 THOU/UL (ref 0–0.4)
EOSINOPHIL NFR BLD AUTO: 1 % (ref 0–6)
ERYTHROCYTE [DISTWIDTH] IN BLOOD BY AUTOMATED COUNT: 15.2 % (ref 11–14.5)
GFR SERPL CREATININE-BSD FRML MDRD: >60 ML/MIN/1.73M2
GLUCOSE BLD-MCNC: 85 MG/DL (ref 70–125)
HCT VFR BLD AUTO: 36.7 % (ref 35–47)
HGB BLD-MCNC: 11.6 G/DL (ref 12–16)
LYMPHOCYTES # BLD AUTO: 2.4 THOU/UL (ref 0.8–4.4)
LYMPHOCYTES NFR BLD AUTO: 52 % (ref 20–40)
MCH RBC QN AUTO: 32.4 PG (ref 27–34)
MCHC RBC AUTO-ENTMCNC: 31.6 G/DL (ref 32–36)
MCV RBC AUTO: 103 FL (ref 80–100)
MONOCYTES # BLD AUTO: 0.3 THOU/UL (ref 0–0.9)
MONOCYTES NFR BLD AUTO: 6 % (ref 2–10)
NEUTROPHILS # BLD AUTO: 1.8 THOU/UL (ref 2–7.7)
NEUTROPHILS NFR BLD AUTO: 40 % (ref 50–70)
PLATELET # BLD AUTO: 151 THOU/UL (ref 140–440)
PMV BLD AUTO: 11.1 FL (ref 8.5–12.5)
POTASSIUM BLD-SCNC: 4.2 MMOL/L (ref 3.5–5)
PROT SERPL-MCNC: 6.6 G/DL (ref 6–8)
RBC # BLD AUTO: 3.58 MILL/UL (ref 3.8–5.4)
SODIUM SERPL-SCNC: 138 MMOL/L (ref 136–145)
TSH SERPL DL<=0.005 MIU/L-ACNC: 1.08 UIU/ML (ref 0.3–5)
WBC: 4.6 THOU/UL (ref 4–11)

## (undated) DEVICE — NDL 30GA 0.5" 305106

## (undated) DEVICE — DRSG GAUZE 4X4" 2187

## (undated) DEVICE — LINEN TOWEL PACK X5 5464

## (undated) DEVICE — SYR 01ML TBC SLIP TIP W/O NDL

## (undated) DEVICE — EYE DRSG PAD OVAL

## (undated) DEVICE — SOL WATER IRRIG 500ML BOTTLE 2F7113

## (undated) DEVICE — BOWL 32OZ STERILE 01232

## (undated) DEVICE — EYE SHIELD PLASTIC

## (undated) DEVICE — APPLICATOR COTTON TIP 3" PKG OF 10 34831010

## (undated) RX ORDER — ACETAMINOPHEN 325 MG/1
TABLET ORAL
Status: DISPENSED
Start: 2019-05-22

## (undated) RX ORDER — GABAPENTIN 100 MG/1
CAPSULE ORAL
Status: DISPENSED
Start: 2019-05-22